# Patient Record
Sex: FEMALE | Race: WHITE | NOT HISPANIC OR LATINO | URBAN - METROPOLITAN AREA
[De-identification: names, ages, dates, MRNs, and addresses within clinical notes are randomized per-mention and may not be internally consistent; named-entity substitution may affect disease eponyms.]

---

## 2021-10-02 ENCOUNTER — EMERGENCY (EMERGENCY)
Facility: HOSPITAL | Age: 36
LOS: 1 days | Discharge: HOME | End: 2021-10-02
Attending: EMERGENCY MEDICINE
Payer: COMMERCIAL

## 2021-10-02 VITALS
SYSTOLIC BLOOD PRESSURE: 127 MMHG | RESPIRATION RATE: 18 BRPM | HEIGHT: 65 IN | WEIGHT: 139.99 LBS | HEART RATE: 72 BPM | DIASTOLIC BLOOD PRESSURE: 85 MMHG | OXYGEN SATURATION: 98 % | TEMPERATURE: 98 F

## 2021-10-02 PROCEDURE — 73110 X-RAY EXAM OF WRIST: CPT

## 2021-10-02 PROCEDURE — 73110 X-RAY EXAM OF WRIST: CPT | Mod: 26,RT

## 2021-10-02 PROCEDURE — 73140 X-RAY EXAM OF FINGER(S): CPT

## 2021-10-02 PROCEDURE — 99284 EMERGENCY DEPT VISIT MOD MDM: CPT | Mod: 25

## 2021-10-02 PROCEDURE — 73140 X-RAY EXAM OF FINGER(S): CPT | Mod: 26,RT

## 2021-10-02 PROCEDURE — 99283 EMERGENCY DEPT VISIT LOW MDM: CPT

## 2021-10-02 NOTE — ED PROVIDER NOTE - PATIENT PORTAL LINK FT
You can access the FollowMyHealth Patient Portal offered by [facility] by registering at the following website: [email or web address]. By joining [employer]’s FollowMyHealth portal, you will also be able to view your health information using other applications (apps) compatible with our system.

## 2021-10-02 NOTE — ED PROVIDER NOTE - NSFOLLOWUPINSTRUCTIONS_ED_ALL_ED_FT
TAKE MOTRIN AS NEEDED FOR PAIN.    USE AN ICE PACK ON THE WRIST/THUMB.    RETURN TO THE ED FOR ANY NEW OR WORSENING SYMPTOMS.      Jammed Finger    A jammed finger is an injury to the ligaments that support your finger bones. Ligaments are bands of tissue that connect bones to each other. This injury happens when the ligaments are stretched beyond their normal range of motion (sprained). Symptoms may include:  •Pain.      •Swelling.      •Discoloration and bruising around the joint.      •Difficulty bending or straightening (extending) the finger.      •Not being able to use the finger normally.    Treatment may include:  •Wearing a splint to keep the finger in place while it heals.      •Taping the injured finger to the fingers beside it (buddy taping) to keep it in place.      •Pain medicines.      •Physical therapy to help you regain finger strength and range of motion.        Follow these instructions at home:      If you have a splint or buddy taping:      •Wear the splint or tape as told by your health care provider. Remove the splint or tape only as told by your health care provider.      •Loosen the splint or tape if your finger tingles, becomes numb, or turns cold and blue.      •Cover the splint or tape with a watertight covering when you take a bath or a shower.      •If you have buddy taping, ask your health care provider if it needs to be adjusted or removed and redone periodically.      Managing pain, stiffness, and swelling   •If directed, put ice on the injured finger:  •Put ice in a plastic bag.      •Place a towel between your skin and the bag.      •Leave the ice on for 20 minutes, 2–3 times a day.        •Raise (elevate) the injured finger above the level of your heart while you are sitting or lying down.      General instructions     •Take over-the-counter and prescription medicines only as told by your health care provider.      •Rest your finger until your health care provider says you can move it again. Your finger may feel stiff and painful for a while.      •Do physical therapy exercises as directed. It may help to start doing these exercises with your hand in a bowl of warm water.      •Keep all follow-up visits as told by your health care provider. This is important.        Contact a health care provider if:    •You have pain or swelling that gets worse or does not get better with medicine.      •You have been treated for your jammed finger but you still cannot extend the finger.      •You have a fever.        Get help right away if:  •Even after loosening your splint or tape, your finger:  •Is very red and swollen.      •Is white or blue.      •Feels tingly or becomes numb.          Summary    •A jammed finger is an injury to the ligaments that support the finger bones.      •This injury happens when the ligaments are stretched beyond their normal range of motion (sprained).      •Treatment may include a splint or buddy taping.      This information is not intended to replace advice given to you by your health care provider. Make sure you discuss any questions you have with your health care provider.
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
## 2021-10-02 NOTE — ED PROVIDER NOTE - NSFOLLOWUPCLINICS_GEN_ALL_ED_FT
[facility]  Sports Medicine  [address] [place] [address]  Phone: [phone]  Fax:   Follow Up Time: Routine
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
## 2021-10-02 NOTE — ED PROVIDER NOTE - PHYSICAL EXAMINATION
GEN: NAD  R hand: thumb/wrist with preserved ROM, no overt deformity or ecchymosis, normal cap refill

## 2023-08-07 PROBLEM — Z00.00 ENCOUNTER FOR PREVENTIVE HEALTH EXAMINATION: Status: ACTIVE | Noted: 2023-08-07

## 2023-08-08 ENCOUNTER — APPOINTMENT (OUTPATIENT)
Dept: SURGERY | Facility: CLINIC | Age: 38
End: 2023-08-08
Payer: COMMERCIAL

## 2023-08-08 DIAGNOSIS — Z78.9 OTHER SPECIFIED HEALTH STATUS: ICD-10-CM

## 2023-08-08 DIAGNOSIS — K76.9 LIVER DISEASE, UNSPECIFIED: ICD-10-CM

## 2023-08-08 PROCEDURE — 99205 OFFICE O/P NEW HI 60 MIN: CPT

## 2023-08-17 PROBLEM — K76.9 LIVER LESION: Status: ACTIVE | Noted: 2023-08-17

## 2023-08-17 PROBLEM — Z78.9 SOCIAL ALCOHOL USE: Status: ACTIVE | Noted: 2023-08-17

## 2023-08-17 RX ORDER — OMEPRAZOLE 40 MG/1
40 CAPSULE, DELAYED RELEASE ORAL
Refills: 0 | Status: ACTIVE | COMMUNITY

## 2023-08-17 NOTE — PHYSICAL EXAM
[JVD] : no jugular venous distention  [Respiratory Effort] : normal respiratory effort [Abdominal Masses] : No abdominal masses [Abdomen Tenderness] : ~T ~M No abdominal tenderness [No HSM] : no hepatosplenomegaly [No Rash or Lesion] : No rash or lesion [Alert] : alert [Oriented to Person] : oriented to person [Oriented to Place] : oriented to place [Oriented to Time] : disoriented to time [de-identified] : WD/WN woman in NAD [de-identified] : NCAT no scleral icterus

## 2023-08-17 NOTE — ASSESSMENT
[FreeTextEntry1] : The patient has multiple liver lesions likely benign, representing focal nodular hyperplasia and/or atypical adenoma. The images will be reviewed at liver conference. No surgical intervention is contemplated at this time. I will see her in follow up.

## 2023-08-17 NOTE — HISTORY OF PRESENT ILLNESS
[de-identified] : The patient presents with a history of multiple liver lesions. She has a history of heartburn which led to imaging that revealed these lesions in 2021Recent MRI was compared to prior  scans and revealed no significant change. The lesions are reported as likely benign, however one lesioin may represent an atypical adenoma. There history of oral contraceptives for a short time in the distant past.

## 2023-08-24 ENCOUNTER — NON-APPOINTMENT (OUTPATIENT)
Age: 38
End: 2023-08-24

## 2024-01-17 ENCOUNTER — APPOINTMENT (OUTPATIENT)
Dept: MRI IMAGING | Facility: CLINIC | Age: 39
End: 2024-01-17
Payer: COMMERCIAL

## 2024-01-17 ENCOUNTER — TRANSCRIPTION ENCOUNTER (OUTPATIENT)
Age: 39
End: 2024-01-17

## 2024-01-17 ENCOUNTER — OUTPATIENT (OUTPATIENT)
Dept: OUTPATIENT SERVICES | Facility: HOSPITAL | Age: 39
LOS: 1 days | End: 2024-01-17
Payer: COMMERCIAL

## 2024-01-17 DIAGNOSIS — K76.9 LIVER DISEASE, UNSPECIFIED: ICD-10-CM

## 2024-01-17 PROCEDURE — A9581: CPT

## 2024-01-17 PROCEDURE — 74183 MRI ABD W/O CNTR FLWD CNTR: CPT

## 2024-01-17 PROCEDURE — 74183 MRI ABD W/O CNTR FLWD CNTR: CPT | Mod: 26
